# Patient Record
Sex: MALE | Race: BLACK OR AFRICAN AMERICAN
[De-identification: names, ages, dates, MRNs, and addresses within clinical notes are randomized per-mention and may not be internally consistent; named-entity substitution may affect disease eponyms.]

---

## 2018-12-04 ENCOUNTER — HOSPITAL ENCOUNTER (INPATIENT)
Dept: HOSPITAL 61 - 5 SOUTH | Age: 23
LOS: 4 days | Discharge: LEFT BEFORE DISCHARGE | DRG: 684 | End: 2018-12-08
Payer: COMMERCIAL

## 2018-12-04 VITALS — SYSTOLIC BLOOD PRESSURE: 143 MMHG | DIASTOLIC BLOOD PRESSURE: 87 MMHG

## 2018-12-04 VITALS — SYSTOLIC BLOOD PRESSURE: 136 MMHG | DIASTOLIC BLOOD PRESSURE: 94 MMHG

## 2018-12-04 VITALS — SYSTOLIC BLOOD PRESSURE: 145 MMHG | DIASTOLIC BLOOD PRESSURE: 93 MMHG

## 2018-12-04 VITALS — SYSTOLIC BLOOD PRESSURE: 134 MMHG | DIASTOLIC BLOOD PRESSURE: 90 MMHG

## 2018-12-04 VITALS — BODY MASS INDEX: 29.53 KG/M2 | WEIGHT: 230.12 LBS | HEIGHT: 74 IN

## 2018-12-04 DIAGNOSIS — Z79.899: ICD-10-CM

## 2018-12-04 DIAGNOSIS — Z88.8: ICD-10-CM

## 2018-12-04 DIAGNOSIS — I10: ICD-10-CM

## 2018-12-04 DIAGNOSIS — E88.09: ICD-10-CM

## 2018-12-04 DIAGNOSIS — N17.9: Primary | ICD-10-CM

## 2018-12-04 DIAGNOSIS — Z53.21: ICD-10-CM

## 2018-12-04 DIAGNOSIS — N04.0: ICD-10-CM

## 2018-12-04 LAB
ALBUMIN SERPL-MCNC: 1 G/DL (ref 3.4–5)
ALBUMIN/GLOB SERPL: 0.3 {RATIO} (ref 1–1.7)
ALP SERPL-CCNC: 66 U/L (ref 46–116)
ALT SERPL-CCNC: 67 U/L (ref 16–63)
ANION GAP SERPL CALC-SCNC: 4 MMOL/L (ref 6–14)
APTT PPP: YELLOW S
AST SERPL-CCNC: 47 U/L (ref 15–37)
BACTERIA #/AREA URNS HPF: 0 /HPF
BILIRUB SERPL-MCNC: 0.4 MG/DL (ref 0.2–1)
BILIRUB UR QL STRIP: NEGATIVE
BUN SERPL-MCNC: 18 MG/DL (ref 8–26)
BUN/CREAT SERPL: 16 (ref 6–20)
CALCIUM SERPL-MCNC: 8.1 MG/DL (ref 8.5–10.1)
CHLORIDE SERPL-SCNC: 102 MMOL/L (ref 98–107)
CO2 SERPL-SCNC: 30 MMOL/L (ref 21–32)
CREAT SERPL-MCNC: 1.1 MG/DL (ref 0.7–1.3)
ERYTHROCYTE [DISTWIDTH] IN BLOOD BY AUTOMATED COUNT: 13.8 % (ref 11.5–14.5)
FIBRINOGEN PPP-MCNC: CLEAR MG/DL
GFR SERPLBLD BASED ON 1.73 SQ M-ARVRAT: 100.4 ML/MIN
GLOBULIN SER-MCNC: 3.5 G/DL (ref 2.2–3.8)
GLUCOSE SERPL-MCNC: 93 MG/DL (ref 70–99)
HCT VFR BLD CALC: 49.3 % (ref 39–53)
HGB BLD-MCNC: 16.9 G/DL (ref 13–17.5)
HYALINE CASTS #/AREA URNS LPF: (no result) /HPF
MCH RBC QN AUTO: 29 PG (ref 25–35)
MCHC RBC AUTO-ENTMCNC: 34 G/DL (ref 31–37)
MCV RBC AUTO: 84 FL (ref 79–100)
NITRITE UR QL STRIP: NEGATIVE
PH UR STRIP: 6.5 [PH]
PLATELET # BLD AUTO: 272 X10^3/UL (ref 140–400)
POTASSIUM SERPL-SCNC: 3.8 MMOL/L (ref 3.5–5.1)
PROT SERPL-MCNC: 4.5 G/DL (ref 6.4–8.2)
PROT UR STRIP-MCNC: >=300 MG/DL
RBC # BLD AUTO: 5.89 X10^6/UL (ref 4.3–5.7)
RBC #/AREA URNS HPF: (no result) /HPF (ref 0–2)
SODIUM SERPL-SCNC: 136 MMOL/L (ref 136–145)
UROBILINOGEN UR-MCNC: 0.2 MG/DL
WBC # BLD AUTO: 8.6 X10^3/UL (ref 4–11)
WBC #/AREA URNS HPF: (no result) /HPF (ref 0–4)

## 2018-12-04 PROCEDURE — 76770 US EXAM ABDO BACK WALL COMP: CPT

## 2018-12-04 PROCEDURE — 84156 ASSAY OF PROTEIN URINE: CPT

## 2018-12-04 PROCEDURE — 82570 ASSAY OF URINE CREATININE: CPT

## 2018-12-04 PROCEDURE — 80061 LIPID PANEL: CPT

## 2018-12-04 PROCEDURE — 93971 EXTREMITY STUDY: CPT

## 2018-12-04 PROCEDURE — 81001 URINALYSIS AUTO W/SCOPE: CPT

## 2018-12-04 PROCEDURE — 82575 CREATININE CLEARANCE TEST: CPT

## 2018-12-04 PROCEDURE — 36415 COLL VENOUS BLD VENIPUNCTURE: CPT

## 2018-12-04 PROCEDURE — 80069 RENAL FUNCTION PANEL: CPT

## 2018-12-04 PROCEDURE — 85027 COMPLETE CBC AUTOMATED: CPT

## 2018-12-04 PROCEDURE — 80053 COMPREHEN METABOLIC PANEL: CPT

## 2018-12-04 RX ADMIN — FUROSEMIDE SCH MG: 20 TABLET ORAL at 19:20

## 2018-12-05 VITALS — SYSTOLIC BLOOD PRESSURE: 150 MMHG | DIASTOLIC BLOOD PRESSURE: 93 MMHG

## 2018-12-05 VITALS — SYSTOLIC BLOOD PRESSURE: 141 MMHG | DIASTOLIC BLOOD PRESSURE: 89 MMHG

## 2018-12-05 VITALS — SYSTOLIC BLOOD PRESSURE: 159 MMHG | DIASTOLIC BLOOD PRESSURE: 98 MMHG

## 2018-12-05 VITALS — SYSTOLIC BLOOD PRESSURE: 138 MMHG | DIASTOLIC BLOOD PRESSURE: 82 MMHG

## 2018-12-05 VITALS — SYSTOLIC BLOOD PRESSURE: 143 MMHG | DIASTOLIC BLOOD PRESSURE: 86 MMHG

## 2018-12-05 VITALS — DIASTOLIC BLOOD PRESSURE: 98 MMHG | SYSTOLIC BLOOD PRESSURE: 145 MMHG

## 2018-12-05 LAB — PROT UR-MCNC: 2390.1 MG/DL

## 2018-12-05 RX ADMIN — FUROSEMIDE SCH MG: 20 TABLET ORAL at 08:28

## 2018-12-05 NOTE — PDOC2
CONSULT


Date of Consult


Date of Consult


DATE: 12/5/18 


TIME: 16:02





Reason for Consult


Reason for Consult:


Proteinuria, Dx of TAYLOR





Identification/Chief Complaint


Chief Complaint


Swelling both arms and back





Source


Source:  Caregiver, Chart review, Patient





History of Present Illness


Reason for Visit:


The patient is a 23-year-old  male patient with Dx of TAYLOR Dx at 

the age of 14 yrs . He gas been on prednisone and ws tapered off aprox 18 

months ago when he was in [place] as he was in remission 


He was also on ARB which was dced by MD as per Pt and Mother  as his BP was 

stable  . They Moved to [facility] 18 months back and was seen by Dr. Schultz from 

our group in Mid 2017  .


 Mom reports they did not go back for Follow up appt as Urine was Normal and Pt 

was feeling fine  





He apparently was diagnosed initially at the age of 14 at [facility] 

in [place]   He moved to [place], [place] where he was evaluated


in [facility] and about 4 years ago he underwent another kidney 

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  


He reports 2 days back he started having vomiting and thinks it was due to 

something he ate . His Mom claims that he eats out a Lot  .No F/C/D. No CP .


 He apparently gained almost 20 pounds, weight  here   was  234 pounds, he 

reports his wt has been in 190's    Denied any abdominal pain.  Denied any


dizziness or lightheadedness.


No Vomiting today. Main concern is swelling in his arms- though in past he had 

LE edema. He feels Rt arm improved but Lt worse since IV placed 


He also feels swelling in his back- Flank and Lower back . No Pain /Tenderness 


He denies any Hx of Kidney stones . He denies any NSAID's , No OTC meds, Denies 

any Creatine or AA supplements 


His average BP is 120's without any meds





Current Medications


Current Medications





Current Medications


Acetaminophen (Tylenol) 650 mg PRN Q6HRS  PRN PO MILD PAIN / TEMP Last 

administered on 12/4/18at 18:10;  Start 12/4/18 at 13:30


Furosemide (Lasix) 20 mg DAILY PO ;  Start 12/5/18 at 09:00;  Stop 12/5/18 at 09

:00;  Status DC


Furosemide (Lasix) 20 mg DAILY PO  Last administered on 12/5/18at 08:28;  Start 

12/4/18 at 18:15





Active Scripts


Active


Reported


Furosemide 20 Mg Tablet 20 Mg PO DAILY


Prednisone 20 Mg Tablet 20 Mg PO DAILY





Allergies


Allergies:  


Coded Allergies:  


     lisinopril (Verified  Allergy, Intermediate, 12/4/18)


 


 Wet cough





ROS


Review of System


   As per HPI





Physical Exam


Physical Exam


GEN:  NAD  


HEEN:      OM moist  


NECK: Supple, No JVD  


CVS:   RRR, No MGR 


RESP: CTA, No acc Muscle use 


GI:        BS + ve, NO Bruit, Non Tender, Non Distended


:      No CVA tenderness, No  Suprapubic Tenderness, No Brunner


Skin- No rash


Ext- Trace LE edema , Lt arm swollen  > Rt (mild)


Vital Signs





Vital Signs








  Date Time  Temp Pulse Resp B/P (MAP) Pulse Ox O2 Delivery O2 Flow Rate FiO2


 


12/5/18 15:00 97.7 62 20 150/93 (112) 95 Room Air  





 97.7       








Assessment & Plan


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


Was on prednisone , tapered approx 18 Months in [place]  


Moved to  since, seen by Dr. Schultz once last year, didint go for Follow up 

appt 


Will obtain Office records and awaiting records from [place]  


UA reviewed  , Creat  within Normal range  


24 Ur Pr in progress, Check US, Pr/Cr  


StrictI/O, daily standing weight  





HTN- BP High 


Not on meds 


Awaiting labs , will consider starting ACE-I or ARB  





Lt Upper arm swelling - Order US  





Vomiting Resolved


Likely Gastroenteritis 





Discussed at great length with patient, his Mom and RN





Labs


Labs





Laboratory Tests








Test


 12/4/18


14:30 12/4/18


19:27


 


White Blood Count


 8.6 x10^3/uL


(4.0-11.0) 





 


Red Blood Count


 5.89 x10^6/uL


(4.30-5.70) 





 


Hemoglobin


 16.9 g/dL


(13.0-17.5) 





 


Hematocrit


 49.3 %


(39.0-53.0) 





 


Mean Corpuscular Volume 84 fL ()  


 


Mean Corpuscular Hemoglobin 29 pg (25-35)  


 


Mean Corpuscular Hemoglobin


Concent 34 g/dL


(31-37) 





 


Red Cell Distribution Width


 13.8 %


(11.5-14.5) 





 


Platelet Count


 272 x10^3/uL


(140-400) 





 


Sodium Level


 136 mmol/L


(136-145) 





 


Potassium Level


 3.8 mmol/L


(3.5-5.1) 





 


Chloride Level


 102 mmol/L


() 





 


Carbon Dioxide Level


 30 mmol/L


(21-32) 





 


Anion Gap 4 (6-14)  


 


Blood Urea Nitrogen


 18 mg/dL


(8-26) 





 


Creatinine


 1.1 mg/dL


(0.7-1.3) 





 


Estimated GFR


(Cockcroft-Gault) 100.4 


 





 


BUN/Creatinine Ratio 16 (6-20)  


 


Glucose Level


 93 mg/dL


(70-99) 





 


Calcium Level


 8.1 mg/dL


(8.5-10.1) 





 


Total Bilirubin


 0.4 mg/dL


(0.2-1.0) 





 


Aspartate Amino Transf


(AST/SGOT) 47 U/L (15-37) 


 





 


Alanine Aminotransferase


(ALT/SGPT) 67 U/L (16-63) 


 





 


Alkaline Phosphatase


 66 U/L


() 





 


Total Protein


 4.5 g/dL


(6.4-8.2) 





 


Albumin


 1.0 g/dL


(3.4-5.0) 





 


Albumin/Globulin Ratio 0.3 (1.0-1.7)  


 


Urine Collection Type  Unknown 


 


Urine Color  Yellow 


 


Urine Clarity  Clear 


 


Urine pH  6.5 


 


Urine Specific Gravity  1.020 


 


Urine Protein


 


 >=300 mg/dL


(NEG-TRACE)


 


Urine Glucose (UA)


 


 Negative mg/dL


(NEG)


 


Urine Ketones (Stick)


 


 Negative mg/dL


(NEG)


 


Urine Blood  Moderate (NEG) 


 


Urine Nitrite  Negative (NEG) 


 


Urine Bilirubin  Negative (NEG) 


 


Urine Urobilinogen Dipstick


 


 0.2 mg/dL (0.2


mg/dL)


 


Urine Leukocyte Esterase  Negative (NEG) 


 


Urine RBC  1-2 /HPF (0-2) 


 


Urine WBC  1-4 /HPF (0-4) 


 


Urine Bacteria  0 /HPF (0-FEW) 


 


Urine Hyaline Casts


 


 Occasional


/HPF


 


Urine Random Total Protein


 


 1484.0 mg/dL


(Not Estab.)








Laboratory Tests








Test


 12/4/18


19:27


 


Urine Collection Type Unknown 


 


Urine Color Yellow 


 


Urine Clarity Clear 


 


Urine pH 6.5 


 


Urine Specific Gravity 1.020 


 


Urine Protein


 >=300 mg/dL


(NEG-TRACE)


 


Urine Glucose (UA)


 Negative mg/dL


(NEG)


 


Urine Ketones (Stick)


 Negative mg/dL


(NEG)


 


Urine Blood Moderate (NEG) 


 


Urine Nitrite Negative (NEG) 


 


Urine Bilirubin Negative (NEG) 


 


Urine Urobilinogen Dipstick


 0.2 mg/dL (0.2


mg/dL)


 


Urine Leukocyte Esterase Negative (NEG) 


 


Urine RBC 1-2 /HPF (0-2) 


 


Urine WBC 1-4 /HPF (0-4) 


 


Urine Bacteria 0 /HPF (0-FEW) 


 


Urine Hyaline Casts


 Occasional


/HPF


 


Urine Random Total Protein


 1484.0 mg/dL


(Not Estab.)








Review


All relevant outside records, renal labs, imaging studies, telemetry/EKG's were 

reviewed.











EZ CABA MD Dec 5, 2018 16:16

## 2018-12-05 NOTE — HP
ADMIT DATE:  12/05/2018



HISTORY OF PRESENT ILLNESS:  The patient is a 23-year-old  male

patient who was seen yesterday and was admitted initially under Dr. Ramirez to

[facility] with a relapse of his minimal change disease.  He apparently

was diagnosed initially at the age of 14 at [facility] [place] and

was treated with steroids.  He moved to [place], [place] where he was evaluated

in [facility] and about 4 years ago he underwent another kidney

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  He stopped all his

medication about 18 months ago.  He was on prednisone and Diovan and has been

doing fine until 2 days ago when he started having nausea, vomiting and weight

gain.  He apparently gained almost 20 pounds, although he was weighed here

yesterday and was weighing 234 pounds.  He did complain of some nausea and

vomited 2 days ago, but not yesterday.  Denied any abdominal pain.  Denied any

dizziness or lightheadedness.



PAST MEDICAL HISTORY:  Significant for minimal change disease as well as

hypertension.



PAST SURGICAL HISTORY:  Unremarkable.



ALLERGIES:  HE IS ALLERGIC TO LISINOPRIL AS HE DEVELOPED CHRONIC COUGH.



MEDICATIONS:  He is currently on no medication.



FAMILY HISTORY:  He has 3 sisters, one brother, all healthy.  His father is

alive at the age of 42 and has sarcoidosis.  Mother is alive at age of 41 and is

healthy.



SOCIAL HISTORY:  He is single, graduated from high school.  He does not smoke,

drink alcohol or use any recreational drugs.  He is currently unemployed.



REVIEW OF SYSTEMS:  The patient denied any blurring of vision, cataract,

glaucoma or macular degeneration.  Denied any earache, tinnitus or sensorineural

deafness.  Denied any nosebleeds, stuffy nose or postnasal drip.  Denied any

sore throat, sore tongue, toothache, hoarseness of voice or difficulty

swallowing.  Did complain of some nausea and vomiting and has tendency towards

constipation.  Denied any hematemesis, melena or hematochezia.  Denied any

dysuria, frequency or hematuria.  Denied any chest pain, shortness of breath,

orthopnea or paroxysmal nocturnal dyspnea.  Denied any cough, phlegm or

hemoptysis.  Denied any chills, rigors or fever.



PHYSICAL EXAMINATION:

GENERAL:  When I saw him, he looked well and was clearly in no apparent

respiratory distress.  No pallor, jaundice, cyanosis, or thyromegaly.  No

jugular venous distention.  I could not really see any lower limb edema.

VITAL SIGNS:  His heart rate was 73, blood pressure 145/93, temperature was

98.2, respiratory rate was 17 and oxygen saturation was 96% on room air.

HEAD, EYES, EARS, NOSE AND THROAT:  Showed normocephalic, atraumatic.

NECK:  Supple.

HEART:  Showed normal first and second heart sounds with no gallop, rub or

murmur.

CHEST:  Clear to auscultation.  No crepitation or rhonchi.

ABDOMEN:  Distended, soft, nontender.  No guarding or rigidity.  No

organomegaly.  All hernial orifices intact.  Bowel sounds normal.

NEUROLOGIC:  He is awake, alert, responding appropriately.  All cranial nerves

intact.

EXTREMITIES:  He moves extremities without difficulty, ambulates without

assistance or assistive devices.  Examination of the lower extremities showed no

clubbing, cyanosis, and I do not see really any edema on both lower extremities.

 His upper extremities seem to be more swollen than his lower extremities.



LABORATORY DATA:  Showed a serum sodium 136, potassium 3.8, chloride 102,

bicarbonate 30, anion gap of 4, BUN 18, creatinine 1.1, estimated GFR was 100 mL

per minute.  His glucose was 93, calcium was 8.1.  Total bilirubin and alkaline

phosphatase normal.  AST, ALT is slightly elevated.  Total protein was 4.5,

albumin was 1.  His white cell count was 8600, hemoglobin 16.9, hematocrit 49.3,

MCV 84 and platelet count 272,000.  His urinalysis showed the urine was yellow,

clear, his pH was 6.5, specific gravity was 1.020.  There was large amount of

protein.  The urine was negative for glucose, ketones.  There was moderate

amount of blood, negative for nitrite and leukocyte esterase, 1-2 rbc's, 1-4

wbc's, and no bacteria.



IMPRESSION:  In summary, this is a 23-year-old  male patient

with known history of minimal change disease diagnosed when he was 14 years old.

 His last biopsy was done at [facility] in [place], [place] about 4 years

ago and he stopped taking his steroids and Diovan about 18 months ago.  He came

now complaining of weight gain, nausea and vomiting, and high blood pressure. 

His urinalysis showed large amount of protein.  He has also moderate amount of

blood.



PLAN:  My plan is to get the records from [place], [place], also do a 24-hour

urine for proteinuria and creatinine clearance as he seemed to have nephrotic

syndrome as he has hematuria and his blood pressure is high, although his kidney

functions are normal.  He has severe hypoalbuminemia.  We will consult the

Nephrology team and decide on further management accordingly.

 



______________________________

RICARDA CHRISTOPHER MD



DR:  KYREE/eloisa  JOB#:  [id number] / [id number]

DD:  12/05/2018 09:49  DT:  12/05/2018 10:06

## 2018-12-06 VITALS — SYSTOLIC BLOOD PRESSURE: 139 MMHG | DIASTOLIC BLOOD PRESSURE: 97 MMHG

## 2018-12-06 VITALS — DIASTOLIC BLOOD PRESSURE: 80 MMHG | SYSTOLIC BLOOD PRESSURE: 134 MMHG

## 2018-12-06 VITALS — SYSTOLIC BLOOD PRESSURE: 145 MMHG | DIASTOLIC BLOOD PRESSURE: 91 MMHG

## 2018-12-06 VITALS — DIASTOLIC BLOOD PRESSURE: 102 MMHG | SYSTOLIC BLOOD PRESSURE: 141 MMHG

## 2018-12-06 VITALS — DIASTOLIC BLOOD PRESSURE: 90 MMHG | SYSTOLIC BLOOD PRESSURE: 130 MMHG

## 2018-12-06 VITALS — DIASTOLIC BLOOD PRESSURE: 86 MMHG | SYSTOLIC BLOOD PRESSURE: 130 MMHG

## 2018-12-06 LAB
ALBUMIN SERPL-MCNC: 0.7 G/DL (ref 3.4–5)
ANION GAP SERPL CALC-SCNC: 2 MMOL/L (ref 6–14)
BUN SERPL-MCNC: 22 MG/DL (ref 8–26)
CALCIUM SERPL-MCNC: 7.6 MG/DL (ref 8.5–10.1)
CHLORIDE SERPL-SCNC: 102 MMOL/L (ref 98–107)
CO2 SERPL-SCNC: 31 MMOL/L (ref 21–32)
CREAT SERPL-MCNC: 1.3 MG/DL (ref 0.7–1.3)
GFR SERPLBLD BASED ON 1.73 SQ M-ARVRAT: 82.8 ML/MIN
GLUCOSE SERPL-MCNC: 98 MG/DL (ref 70–99)
PHOSPHATE SERPL-MCNC: 4.2 MG/DL (ref 2.6–4.7)
POTASSIUM SERPL-SCNC: 4 MMOL/L (ref 3.5–5.1)
SODIUM SERPL-SCNC: 135 MMOL/L (ref 136–145)

## 2018-12-06 RX ADMIN — PANTOPRAZOLE SODIUM SCH MG: 40 TABLET, DELAYED RELEASE ORAL at 13:36

## 2018-12-06 RX ADMIN — FUROSEMIDE SCH MG: 20 TABLET ORAL at 07:36

## 2018-12-06 NOTE — PDOC
SUBJECTIVE


ROS


No complaints, swelling improved, eating Lunch





OBJECTIVE


Vital Signs





Vital Signs








  Date Time  Temp Pulse Resp B/P (MAP) Pulse Ox O2 Delivery O2 Flow Rate FiO2


 


12/6/18 11:02 98.7 74 20 139/97 (111) 94 Room Air  





 98.7       








I & 0











Intake and Output 


 


 12/6/18





 07:00


 


Intake Total 840 ml


 


Balance 840 ml


 


 


 


Intake Oral 840 ml


 


# Voids 2











PHYSICAL EXAM


Physical Exam


GEN:  NAD  


HEEN:      OM moist  


NECK: Supple, No JVD  


CVS:   RRR, No MGR 


RESP: CTA, No acc Muscle use 


GI:        BS + ve, NO Bruit, Non Tender, Non Distended


:      No CVA tenderness, No  Suprapubic Tenderness, No Brunner


Skin- No rash


Ext- No edema





DIAGNOSIS/ASSESSMENT


Assessment & Plan


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


Was on prednisone , tapered approx 18 Months in [place] 


Non compliance in 2015  - when he moved to [place]  - had recurrence of TAYLOR 


Was started on prednisone 90 mg QD , prior to that he was on tacrolimus and 

Steroids at age of [age] yrs 


Moved to  since, seen by Dr. Schultz once last year, didint go for Follow up 

appt 


Pr/Cr 10 gm , 24 Hr pending  , Creat upto 1.3, Severe Hypoalbuminemia  


Will start Prednisone PO- close to 1 mg/kg /day (90 mg QD), monitor closely 





CKD stage 2- increased Cr from baseline 


Plan as above  








HTN- BP better  


Not on meds 


 will consider starting ACE-I or ARB  





Lt Upper arm swelling -  US  No DVT


Resolved  





Vomiting Resolved


Likely Gastroenteritis 





Discussed at great length with patient,  Mom and RN





COMMENT/RELEVANT DATA


Meds





Current Medications








 Medications


  (Trade)  Dose


 Ordered  Sig/Eric  Start Time


 Stop Time Status Last Admin


Dose Admin


 


 Acetaminophen


  (Tylenol)  650 mg  PRN Q6HRS  PRN  12/4/18 13:30


    12/4/18 18:10


650 MG


 


 Furosemide


  (Lasix)  20 mg  DAILY  12/4/18 18:15


    12/6/18 07:36


20 MG








Lab





Laboratory Tests








Test


 12/6/18


10:25


 


Sodium Level


 135 mmol/L


(136-145)


 


Potassium Level


 4.0 mmol/L


(3.5-5.1)


 


Chloride Level


 102 mmol/L


()


 


Carbon Dioxide Level


 31 mmol/L


(21-32)


 


Anion Gap 2 (6-14) 


 


Blood Urea Nitrogen


 22 mg/dL


(8-26)


 


Creatinine


 1.3 mg/dL


(0.7-1.3)


 


Estimated GFR


(Cockcroft-Gault) 82.8 





 


Glucose Level


 98 mg/dL


(70-99)


 


Calcium Level


 7.6 mg/dL


(8.5-10.1)


 


Phosphorus Level


 4.2 mg/dL


(2.6-4.7)


 


Albumin


 0.7 g/dL


(3.4-5.0)








Results


All relevant outside records, renal labs, imaging studies, telemetry/EKG's were 

reviewed.











EZ CABA MD Dec 6, 2018 13:18

## 2018-12-06 NOTE — PN
DATE:  12/06/2018



SUBJECTIVE:  The patient is resting slightly propped up in bed, in no apparent

distress.  Continued to collect his 24-hour urine for proteinuria and creatinine

clearance.  Denied any complaint.  He has had renal ultrasound, which showed

that his right kidney measured 12.7 cm while the left kidney measured 12.9 cm. 

There is no evidence of hydronephrosis or renal mass.  The renal parenchyma or

echogenicity is within normal limits.  The ultrasound of his left upper

extremity showed no evidence of deep vein thrombosis.



OBJECTIVE:

GENERAL:  On examining him, he looked pale.  No jaundice, cyanosis, or

thyromegaly.  No jugular venous distention.  No limb edema.

VITAL SIGNS:  His heart rate was 83, blood pressure was 130/86, temperature was

97.8, respiratory rate was 16, and oxygen saturation was 93% on room air.

The rest of clinical exam is unremarkable, has not really changed.



PLAN:  To continue with Lasix. Await 24-hour urine collection.

 



______________________________

RICARDA CHRISTOPHER MD



DR:  KYREE/eloisa  JOB#:  [id number] / [id number]

DD:  12/06/2018 08:59  DT:  12/06/2018 09:14

## 2018-12-06 NOTE — RAD
Renal ultrasound, 12/5/2018:

 

HISTORY: Proteinuria

 

The right kidney measures 12.7 cm while the left kidney measures 12.9 cm. 

There is no evidence of hydronephrosis or a renal mass. The renal 

parenchymal echogenicity is within normal limits.

 

The bladder is collapsed and not adequately delineated.

 

IMPRESSION: No significant renal abnormality is detected.

 

Electronically signed by: Rick Moritz, MD (12/6/2018 8:19 AM) [facility]

## 2018-12-06 NOTE — RAD
Left upper extremity venous ultrasound, 12/5/2018:

 

HISTORY: Left upper extremity swelling

 

Duplex evaluation of the major veins in the left upper extremity was 

performed including grayscale, color-flow and spectral Doppler analysis.

 

The left internal jugular, subclavian, axillary, brachial, cephalic and 

basilic veins are all patent. Patent ulnar and radial veins are evident.

 

IMPRESSION: There is no duplex evidence of deep vein thrombosis in left 

upper extremity.

 

Electronically signed by: Rick Moritz, MD (12/6/2018 8:14 AM) [facility]

## 2018-12-07 VITALS — DIASTOLIC BLOOD PRESSURE: 102 MMHG | SYSTOLIC BLOOD PRESSURE: 154 MMHG

## 2018-12-07 VITALS — SYSTOLIC BLOOD PRESSURE: 139 MMHG | DIASTOLIC BLOOD PRESSURE: 99 MMHG

## 2018-12-07 VITALS — SYSTOLIC BLOOD PRESSURE: 144 MMHG | DIASTOLIC BLOOD PRESSURE: 93 MMHG

## 2018-12-07 VITALS — SYSTOLIC BLOOD PRESSURE: 136 MMHG | DIASTOLIC BLOOD PRESSURE: 80 MMHG

## 2018-12-07 VITALS — SYSTOLIC BLOOD PRESSURE: 140 MMHG | DIASTOLIC BLOOD PRESSURE: 94 MMHG

## 2018-12-07 VITALS — DIASTOLIC BLOOD PRESSURE: 83 MMHG | SYSTOLIC BLOOD PRESSURE: 138 MMHG

## 2018-12-07 LAB
ALBUMIN SERPL-MCNC: 0.9 G/DL (ref 3.4–5)
ANION GAP SERPL CALC-SCNC: 4 MMOL/L (ref 6–14)
BUN SERPL-MCNC: 39 MG/DL (ref 8–26)
CALCIUM SERPL-MCNC: 8 MG/DL (ref 8.5–10.1)
CHLORIDE SERPL-SCNC: 100 MMOL/L (ref 98–107)
CHOLEST SERPL-MCNC: 539 MG/DL (ref 0–200)
CHOLEST/HDLC SERPL: 8.3 {RATIO}
CO2 SERPL-SCNC: 30 MMOL/L (ref 21–32)
CREAT CLEAR 24: 147 ML/MIN (ref 97–137)
CREAT SERPL-MCNC: 1.04 MG/DL (ref 0.76–1.27)
CREAT SERPL-MCNC: 2.6 MG/DL (ref 0.7–1.3)
CREAT UR-MCNC: 168.8 MG/DL
GFR SERPLBLD BASED ON 1.73 SQ M-ARVRAT: 37.2 ML/MIN
GLUCOSE SERPL-MCNC: 115 MG/DL (ref 70–99)
HDLC SERPL-MCNC: 65 MG/DL (ref 40–60)
IGM SERPL-MCNC: 1692.3 MG/DL
LDLC: 425 MG/DL (ref 0–100)
PHOSPHATE SERPL-MCNC: 4.7 MG/DL (ref 2.6–4.7)
POTASSIUM SERPL-SCNC: 4.9 MMOL/L (ref 3.5–5.1)
PROT 24H UR-MRATE: (no result) MG/24 HR (ref 30–150)
SODIUM SERPL-SCNC: 134 MMOL/L (ref 136–145)
TRIGL SERPL-MCNC: 245 MG/DL (ref 0–150)
VLDLC: 49 MG/DL (ref 0–40)

## 2018-12-07 RX ADMIN — PANTOPRAZOLE SODIUM SCH MG: 40 TABLET, DELAYED RELEASE ORAL at 08:44

## 2018-12-07 RX ADMIN — FUROSEMIDE SCH MG: 20 TABLET ORAL at 08:44

## 2018-12-07 NOTE — PN
DATE:  12/07/2018



SUBJECTIVE:  The patient is resting, slightly propped up in bed, eating his

breakfast comfortably.  On questioning him, he obviously is concerned about his

blood pressure which is trending upward.  He was started yesterday on prednisone

at 90 mg daily.  His 24-hour urine collection was completed yesterday; however,

I am not really sure exactly how to read the results, it seems that he is losing

a huge amount of protein up to 22 grams in 24-hour if my reading is accurate.



PHYSICAL EXAMINATION:

GENERAL:  When I examined him this morning, he looked well and was clearly in no

apparent respiratory distress, pale.  No jaundice, cyanosis, or thyromegaly.  No

jugular venous distension.  No limb edema.

VITAL SIGNS:  His heart rate was 96, blood pressure 144/93, temperature was

97.6, respiratory rate was 20, and oxygen saturation was 96%.

HEAD, EYES, EARS, NOSE AND THROAT:  Normocephalic, atraumatic.

The rest of clinical exam is unremarkable.



LABORATORY DATA:  His most recent lab work as of yesterday showed his serum

sodium 135, potassium 4, chloride 102, bicarbonate 31, anion gap of 2, BUN of

22, creatinine 1.3, his estimated GFR was 82 mL per minute.  His glucose was 98,

calcium was 7.6, phosphorus was 4.2.  His serum albumin is only 0.7 g/dL.



ASSESSMENT:  Minimal change disease, nephrotic syndrome and relapse with

nephrotic range proteinuria, for which he was started on a prednisone 90 mg once

a day, Protonix as well as furosemide.  Blood pressure is high and he apparently

has problems with lisinopril before as he developed chronic cough.  I will wait

for the recommendation by the nephrologist.

 



______________________________

RICARDA CHRISTOPHER MD



DR:  KYREE/eloisa  JOB#:  [id number] / [id number]

DD:  12/07/2018 09:31  DT:  12/07/2018 10:12

## 2018-12-07 NOTE — PDOC
SUBJECTIVE


ROS


No complaints,





OBJECTIVE


Vital Signs





Vital Signs








  Date Time  Temp Pulse Resp B/P (MAP) Pulse Ox O2 Delivery O2 Flow Rate FiO2


 


12/7/18 11:00 97.1 82 20 140/94 (109) 98 Room Air  





 97.1       








I & 0











Intake and Output 


 


 12/7/18





 07:00


 


Intake Total 480 ml


 


Balance 480 ml


 


 


 


Intake Oral 480 ml


 


# Voids 6











PHYSICAL EXAM


Physical Exam


GEN:  NAD  


HEEN:      OM moist  


NECK: Supple, No JVD  


CVS:   RRR, No MGR 


RESP: CTA, No acc Muscle use 


GI:        BS + ve, NO Bruit, Non Tender, Non Distended


:      No CVA tenderness, No  Suprapubic Tenderness, No Brunner


Skin- No rash


Ext- No edema





DIAGNOSIS/ASSESSMENT


Assessment & Plan





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


Was on prednisone , tapered approx 18 Months in [place] 


Non compliance in 2015  - when he moved to [place]  - had recurrence of TAYLOR (

Dont have Bx report in Chart  ) 


He Was started on prednisone 90 mg QD , prior to that he was on tacrolimus and 

Steroids at age of 14-15 yrs 


Moved to  since, seen by Dr. Schultz once last year, as per mom they  didn't 

keep  Follow up appt 


Pr/Cr 10 gm , 24 Hr Pr 22 gm    , Creat upto 2.6<--1.3, Severe Hypoalbuminemia 

, Hypercholesterolemia  


Started  Prednisone PO- 90 mg QD- 1 st dose 12/6


Based on labs Today  recommend Renal Bx  dw  Kirstie [facility], but Pt wants his 

mother to make the decision 


She called back and wants to hold off the renal Bx  . She will make decision 

when she arrives here . She states "he is not a Guinea pig and Treatment can be 

continued based on his Bx results from [place] " 


At the time of initial consult I had discussed with at great length Dx and 

Treatment Options including  Steroids and Renal Bx . Initially she  claimed  

that  he doesn't need Steroids as she doesn't think he is having a relapse .


Yesterday  very reluctantly they agreed  for PO Prednisone 


Mom arrived and I had a Lengthy discussion regarding Renal Bx, meds, serologies 

, prognosis etc .  Both  Refusing renal bx as well as IV steroids , they want 

to wait to see response with PO steroids  


They refused any further Work up  including PT/INR. 








Hyperlipidemia -  


Start Statin    





JAYDON - Worsening renal function  


 Has been on Lasix as per primary, DC lasix  





HTN- 


Not on meds 


 will consider starting ACE-I or ARB  but currently JAYDON 


Monitor  


  


Lt Upper arm swelling -  US  No DVT


Resolved  





Vomiting Resolved





Discussed at great length with patient,  Mom and RN





COMMENT/RELEVANT DATA


Meds





Current Medications








 Medications


  (Trade)  Dose


 Ordered  Sig/Eric  Start Time


 Stop Time Status Last Admin


Dose Admin


 


 Acetaminophen


  (Tylenol)  650 mg  PRN Q6HRS  PRN  12/4/18 13:30


    12/4/18 18:10


650 MG


 


 Furosemide


  (Lasix)  20 mg  DAILY  12/4/18 18:15


    12/7/18 08:44


20 MG


 


 Pantoprazole


 Sodium


  (Protonix)  40 mg  DAILYAC  12/6/18 13:15


    12/7/18 08:44


40 MG


 


 Prednisone


  (Prednisone)  90 mg  DAILY  12/6/18 14:00


 12/12/18 13:59  12/7/18 08:45


90 MG








Lab





Laboratory Tests








Test


 12/7/18


11:55


 


Sodium Level


 134 mmol/L


(136-145)


 


Potassium Level


 4.9 mmol/L


(3.5-5.1)


 


Chloride Level


 100 mmol/L


()


 


Carbon Dioxide Level


 30 mmol/L


(21-32)


 


Anion Gap 4 (6-14) 


 


Blood Urea Nitrogen


 39 mg/dL


(8-26)


 


Creatinine


 2.6 mg/dL


(0.7-1.3)


 


Estimated GFR


(Cockcroft-Gault) 37.2 





 


Glucose Level


 115 mg/dL


(70-99)


 


Calcium Level


 8.0 mg/dL


(8.5-10.1)


 


Phosphorus Level


 4.7 mg/dL


(2.6-4.7)


 


Albumin


 0.9 g/dL


(3.4-5.0)


 


Triglycerides Level


 245 mg/dL


(0-150)


 


Cholesterol Level


 539 mg/dL


(0-200)


 


LDL Cholesterol, Calculated


 425 mg/dL


(0-100)


 


VLDL Cholesterol, Calculated


 49 mg/dL


(0-40)


 


Non-HDL Cholesterol Calculated


 474 mg/dL


(0-129)


 


HDL Cholesterol


 65 mg/dL


(40-60)


 


Cholesterol/HDL Ratio 8.3 








Results


All relevant outside records, renal labs, imaging studies, telemetry/EKG's were 

reviewed.











EZ CABA MD Dec 7, 2018 16:25

## 2018-12-08 VITALS — DIASTOLIC BLOOD PRESSURE: 96 MMHG | SYSTOLIC BLOOD PRESSURE: 141 MMHG

## 2018-12-08 VITALS — DIASTOLIC BLOOD PRESSURE: 94 MMHG | SYSTOLIC BLOOD PRESSURE: 155 MMHG

## 2018-12-08 VITALS — SYSTOLIC BLOOD PRESSURE: 142 MMHG | DIASTOLIC BLOOD PRESSURE: 81 MMHG

## 2018-12-08 RX ADMIN — PANTOPRAZOLE SODIUM SCH MG: 40 TABLET, DELAYED RELEASE ORAL at 07:43

## 2018-12-08 NOTE — PN
DATE:  12/08/2018



SUBJECTIVE:  The patient is resting slightly propped up in bed, in no apparent

respiratory distress.  He is awake, alert.  Denied any complaint.  Apparently,

Dr. Mathew recommended kidney biopsy, but the patient and his mother refused

stating that he is not _____ and treatment can be continued based on his biopsy

results from [facility].  However, he has now had hematuria.  His blood

pressure is high and they also refused IV steroids and reluctantly agreed to

p.o. prednisone.  His blood pressure continues to be somewhat high and

apparently, reacted to lisinopril, so I will start him on losartan 25 mg and see

how he responds to that.



PHYSICAL EXAMINATION:

GENERAL:  When I saw him today, he looked well and was clearly in no apparent

respiratory distress.

VITAL SIGNS:  His heart rate was 80, blood pressure was 141/96, temperature was

97.7, respiratory rate 20 and oxygen saturation was 96%.

HEENT:  Examination of the head, eyes, ears, nose and throat showed

normocephalic, atraumatic.

NECK:  Supple.

HEART:  Showed normal first and second heart sounds, with no gallop, rub or

murmur.

CHEST:  Clear to auscultation.  No crepitation or rhonchi.

ABDOMEN:  Distended, soft and nontender.  No guarding or rigidity.  No

organomegaly.  All hernial orifices intact.  Bowel sounds normal.

NEUROLOGIC:  He was awake, alert and responding appropriately.  All cranial

nerves intact.  He moves extremities without difficulty, ambulates without

assistance or assistive devices.  His intake was 1414, output recorded.



LABORATORY DATA:  His most recent lab work as of yesterday showed a serum sodium

134, potassium 4.9, chloride 100, bicarbonate 30, anion gap of 4, BUN 36,

creatinine 2.6, estimated GFR was 37 mL per minute, his glucose was 115 and

calcium was 8.  Phosphorus was 4.7.  His serum albumin was only 0.9.  His

triglycerides were 245, total cholesterol was 539, LDL cholesterol was 425 and

HDL cholesterol of 65; the ratio was 8.  His estimated GFR as of 2 days ago was

116, but his creatinine has dramatically risen from 1.1 to 2.6.



ASSESSMENT:

1.  Acute kidney injury.

2.  Nephrotic syndrome.

3.  Hypertension.

4.  His left upper extremity was swollen, but his ultrasound was negative.



PLAN:  My plan is to obviously consider also DVT prophylaxis, but I will repeat

his labs tomorrow to make sure his kidney function is not worsening.

 



______________________________

RICARDA CHRISTOPHER MD



DR:  KYREE/eloisa  JOB#:  [id number] / [id number]

DD:  12/08/2018 10:15  DT:  12/08/2018 12:24

## 2018-12-08 NOTE — PDOC
PROGRESS NOTES


Subjective


Subjective


I was informed by the patient's nurse that they wished to  leave the hospital 

AMA. Hence patient was not seen. No labs are available for review today





Objective


Objective





Vital Signs








  Date Time  Temp Pulse Resp B/P (MAP) Pulse Ox O2 Delivery O2 Flow Rate FiO2


 


12/8/18 11:00 97.8 79 20 155/94 (114) 94 Room Air  





 97.8       














Intake and Output 


 


 12/8/18





 07:00


 


Intake Total 1440 ml


 


Balance 1440 ml


 


 


 


Intake Oral 1440 ml


 


# Voids 5


 


# Bowel Movements 3











Comment


Review of Relevant


I have reviewed the following items álvaro (where applicable) has been applied.


Labs





Laboratory Tests








Test


 12/7/18


11:55


 


Sodium Level


 134 mmol/L


(136-145)


 


Potassium Level


 4.9 mmol/L


(3.5-5.1)


 


Chloride Level


 100 mmol/L


()


 


Carbon Dioxide Level


 30 mmol/L


(21-32)


 


Anion Gap 4 (6-14) 


 


Blood Urea Nitrogen


 39 mg/dL


(8-26)


 


Creatinine


 2.6 mg/dL


(0.7-1.3)


 


Estimated GFR


(Cockcroft-Gault) 37.2 





 


Glucose Level


 115 mg/dL


(70-99)


 


Calcium Level


 8.0 mg/dL


(8.5-10.1)


 


Phosphorus Level


 4.7 mg/dL


(2.6-4.7)


 


Albumin


 0.9 g/dL


(3.4-5.0)


 


Triglycerides Level


 245 mg/dL


(0-150)


 


Cholesterol Level


 539 mg/dL


(0-200)


 


LDL Cholesterol, Calculated


 425 mg/dL


(0-100)


 


VLDL Cholesterol, Calculated


 49 mg/dL


(0-40)


 


Non-HDL Cholesterol Calculated


 474 mg/dL


(0-129)


 


HDL Cholesterol


 65 mg/dL


(40-60)


 


Cholesterol/HDL Ratio 8.3 








Medications





Current Medications


Acetaminophen (Tylenol) 650 mg PRN Q6HRS  PRN PO MILD PAIN / TEMP Last 

administered on 12/4/18at 18:10;  Start 12/4/18 at 13:30


Furosemide (Lasix) 20 mg DAILY PO ;  Start 12/5/18 at 09:00;  Stop 12/5/18 at 09

:00;  Status DC


Furosemide (Lasix) 20 mg DAILY PO  Last administered on 12/7/18at 08:44;  Start 

12/4/18 at 18:15;  Stop 12/7/18 at 16:12;  Status DC


Prednisone (Prednisone) 90 mg DAILY PO  Last administered on 12/8/18at 07:44;  

Start 12/6/18 at 14:00;  Stop 12/12/18 at 13:59


Pantoprazole Sodium (Protonix) 40 mg DAILYAC PO  Last administered on 12/8/18at 

07:43;  Start 12/6/18 at 13:15





Active Scripts


Active


Reported


Furosemide 20 Mg Tablet 20 Mg PO DAILY


Prednisone 20 Mg Tablet 20 Mg PO DAILY


Vitals/I & O





Vital Sign - Last 24 Hours








 12/7/18 12/7/18 12/7/18 12/7/18





 15:00 19:00 20:00 22:36


 


Temp 98.0 97.7  97.5





 98.0 97.7  97.5


 


Pulse 89 74  72


 


Resp 20 18  18


 


B/P (MAP) 138/83 (101) 136/80 (98)  139/99 (112)


 


Pulse Ox 97 99  98


 


O2 Delivery Room Air Room Air Room Air Room Air


 


    





    





 12/8/18 12/8/18 12/8/18 12/8/18





 02:40 07:00 08:00 11:00


 


Temp 97.5 97.7  97.8





 97.5 97.7  97.8


 


Pulse 81 80  79


 


Resp 18 20  20


 


B/P (MAP) 142/81 (101) 141/96 (111)  155/94 (114)


 


Pulse Ox 96 96  94


 


O2 Delivery Room Air Room Air Room Air Room Air














Intake and Output   


 


 12/7/18 12/7/18 12/8/18





 15:00 23:00 07:00


 


Intake Total 200 ml 240 ml 1000 ml


 


Balance 200 ml 240 ml 1000 ml

















ROMAN MONTERO MD Dec 8, 2018 13:30

## 2018-12-19 ENCOUNTER — HOSPITAL ENCOUNTER (OUTPATIENT)
Dept: HOSPITAL 61 - INTRAD | Age: 23
Discharge: HOME | End: 2018-12-19
Payer: COMMERCIAL

## 2018-12-19 VITALS — SYSTOLIC BLOOD PRESSURE: 150 MMHG | DIASTOLIC BLOOD PRESSURE: 96 MMHG

## 2018-12-19 VITALS — SYSTOLIC BLOOD PRESSURE: 160 MMHG | DIASTOLIC BLOOD PRESSURE: 97 MMHG

## 2018-12-19 VITALS — DIASTOLIC BLOOD PRESSURE: 92 MMHG | SYSTOLIC BLOOD PRESSURE: 151 MMHG

## 2018-12-19 VITALS — SYSTOLIC BLOOD PRESSURE: 158 MMHG | DIASTOLIC BLOOD PRESSURE: 91 MMHG

## 2018-12-19 VITALS — DIASTOLIC BLOOD PRESSURE: 96 MMHG | SYSTOLIC BLOOD PRESSURE: 143 MMHG

## 2018-12-19 VITALS
SYSTOLIC BLOOD PRESSURE: 158 MMHG | DIASTOLIC BLOOD PRESSURE: 95 MMHG | DIASTOLIC BLOOD PRESSURE: 95 MMHG | SYSTOLIC BLOOD PRESSURE: 158 MMHG

## 2018-12-19 VITALS — DIASTOLIC BLOOD PRESSURE: 93 MMHG | SYSTOLIC BLOOD PRESSURE: 155 MMHG

## 2018-12-19 VITALS — SYSTOLIC BLOOD PRESSURE: 149 MMHG | DIASTOLIC BLOOD PRESSURE: 91 MMHG

## 2018-12-19 VITALS — SYSTOLIC BLOOD PRESSURE: 156 MMHG | DIASTOLIC BLOOD PRESSURE: 97 MMHG

## 2018-12-19 VITALS — DIASTOLIC BLOOD PRESSURE: 92 MMHG | SYSTOLIC BLOOD PRESSURE: 157 MMHG

## 2018-12-19 VITALS — DIASTOLIC BLOOD PRESSURE: 105 MMHG | SYSTOLIC BLOOD PRESSURE: 155 MMHG

## 2018-12-19 VITALS — WEIGHT: 230 LBS | BODY MASS INDEX: 29.52 KG/M2 | HEIGHT: 74 IN

## 2018-12-19 VITALS — SYSTOLIC BLOOD PRESSURE: 162 MMHG | DIASTOLIC BLOOD PRESSURE: 94 MMHG

## 2018-12-19 VITALS — SYSTOLIC BLOOD PRESSURE: 162 MMHG | DIASTOLIC BLOOD PRESSURE: 96 MMHG

## 2018-12-19 VITALS — DIASTOLIC BLOOD PRESSURE: 96 MMHG | SYSTOLIC BLOOD PRESSURE: 158 MMHG

## 2018-12-19 VITALS — DIASTOLIC BLOOD PRESSURE: 100 MMHG | SYSTOLIC BLOOD PRESSURE: 161 MMHG

## 2018-12-19 VITALS — SYSTOLIC BLOOD PRESSURE: 156 MMHG | DIASTOLIC BLOOD PRESSURE: 88 MMHG

## 2018-12-19 VITALS — DIASTOLIC BLOOD PRESSURE: 97 MMHG | SYSTOLIC BLOOD PRESSURE: 156 MMHG

## 2018-12-19 VITALS — SYSTOLIC BLOOD PRESSURE: 160 MMHG | DIASTOLIC BLOOD PRESSURE: 94 MMHG

## 2018-12-19 DIAGNOSIS — Z79.899: ICD-10-CM

## 2018-12-19 DIAGNOSIS — N26.9: ICD-10-CM

## 2018-12-19 DIAGNOSIS — Z88.8: ICD-10-CM

## 2018-12-19 DIAGNOSIS — N04.0: Primary | ICD-10-CM

## 2018-12-19 DIAGNOSIS — N17.9: ICD-10-CM

## 2018-12-19 LAB
BASOPHILS # BLD AUTO: 0.1 X10^3/UL (ref 0–0.2)
BASOPHILS NFR BLD: 1 % (ref 0–3)
EOSINOPHIL NFR BLD: 0.4 X10^3/UL (ref 0–0.7)
EOSINOPHIL NFR BLD: 4 % (ref 0–3)
ERYTHROCYTE [DISTWIDTH] IN BLOOD BY AUTOMATED COUNT: 13.7 % (ref 11.5–14.5)
HCT VFR BLD CALC: 43.7 % (ref 39–53)
HGB BLD-MCNC: 15 G/DL (ref 13–17.5)
LYMPHOCYTES # BLD: 2.8 X10^3/UL (ref 1–4.8)
LYMPHOCYTES NFR BLD AUTO: 31 % (ref 24–48)
MCH RBC QN AUTO: 29 PG (ref 25–35)
MCHC RBC AUTO-ENTMCNC: 34 G/DL (ref 31–37)
MCV RBC AUTO: 84 FL (ref 79–100)
MONO #: 0.6 X10^3/UL (ref 0–1.1)
MONOCYTES NFR BLD: 6 % (ref 0–9)
NEUT #: 5.1 X10^3UL (ref 1.8–7.7)
NEUTROPHILS NFR BLD AUTO: 57 % (ref 31–73)
PLATELET # BLD AUTO: 351 X10^3/UL (ref 140–400)
PROTHROMBIN TIME: 12.1 SEC (ref 11.7–14)
RBC # BLD AUTO: 5.21 X10^6/UL (ref 4.3–5.7)
WBC # BLD AUTO: 8.9 X10^3/UL (ref 4–11)

## 2018-12-19 PROCEDURE — 85610 PROTHROMBIN TIME: CPT

## 2018-12-19 PROCEDURE — 85025 COMPLETE CBC W/AUTO DIFF WBC: CPT

## 2018-12-19 PROCEDURE — 99153 MOD SED SAME PHYS/QHP EA: CPT

## 2018-12-19 PROCEDURE — 99152 MOD SED SAME PHYS/QHP 5/>YRS: CPT

## 2018-12-19 PROCEDURE — 36415 COLL VENOUS BLD VENIPUNCTURE: CPT

## 2018-12-19 PROCEDURE — 76942 ECHO GUIDE FOR BIOPSY: CPT

## 2018-12-19 PROCEDURE — 50200 RENAL BIOPSY PERQ: CPT

## 2018-12-19 NOTE — PDOC
MODERATE SEDATION ASSESSMENT


RISKS/ALTERNATIVES


Risks/Alternatives


Risks and alternatives of this type of sedation and procedure discussed with:


RISK/ALTERNATIVES:  Patient





H & P ON CHART


H & P


H & P on chart and reviewed for co-morbid conditions and appropriate labs.


H&P ON CHART:  Yes





PREGNANCY STATUS


PREG STATUS ASSESSED:  Yes





MEDS/ALLERGIES REVIEWED


Meds/Allergies Reviewed


Medications and Allergies including time and route of recently administered 

narcotics and sedatives.


MEDS/ALLERGIES REVIEWED:  Yes





ASA RATING


ASA RATING:  II





AIRWAY ASSESSMENT


Airway Assessment


Airway patency, oral function limitations, presence  of caps, crowns, dentures, 

partials, and ability to extend neck assessed.


AIRWAY ASSESSMENT:  Yes





MALLAMPATI SCORE


MALLAMPATI SCORE:  II





PRE-SEDATION ASSESSMENT


PRE-SEDATION ASSESSMENT:  Yes











BLANCA LOPEZ MD Dec 19, 2018 09:59

## 2018-12-19 NOTE — PDOC1
History and Physical


Date of Procedure


Date of Admission








History of Present Illness


Reason for Visit


renal failure





Past Medical History


Past Medical History


see nursing pre-op assessment





Current Medications


Current Medications





Current Medications


Lidocaine/Sodium Bicarbonate (Buffered Lidocaine 1%) 3 ml STK-MED ONCE .ROUTE ;

  Start 12/19/18 at 07:54;  Stop 12/19/18 at 07:55;  Status DC


Midazolam HCl (Versed) 2 mg STK-MED ONCE .ROUTE ;  Start 12/19/18 at 07:54;  

Stop 12/19/18 at 07:56;  Status DC


Fentanyl Citrate (Fentanyl 2ml Vial) 100 mcg STK-MED ONCE .ROUTE ;  Start 12/19/ 18 at 07:54;  Stop 12/19/18 at 07:56;  Status DC


Gelatin (Gelfoam  Size 12-7mm) 1 each STK-MED ONCE .ROUTE ;  Start 12/19/18 at 

07:54;  Stop 12/19/18 at 07:56;  Status DC


Lidocaine/Sodium Bicarbonate (Buffered Lidocaine 1%) 3 ml 1X  ONCE IJ  Last 

administered on 12/19/18at 09:01;  Start 12/19/18 at 08:15;  Stop 12/19/18 at 08

:18;  Status DC


Midazolam HCl (Versed) 2 mg 1X  ONCE IV  Last administered on 12/19/18at 09:01;

  Start 12/19/18 at 08:15;  Stop 12/19/18 at 08:18;  Status DC


Fentanyl Citrate (Fentanyl 2ml Vial) 100 mcg 1X  ONCE IV  Last administered on 

12/19/18at 09:01;  Start 12/19/18 at 08:15;  Stop 12/19/18 at 08:18;  Status DC


Gelatin (Gelfoam  Size 12-7mm) 1 each 1X  ONCE TP  Last administered on 12/19/ 18at 09:00;  Start 12/19/18 at 08:15;  Stop 12/19/18 at 08:18;  Status DC





Active Scripts


Active


No Active Prescriptions or Reported Medications





Allergies


Allergies:  


Coded Allergies:  


     lisinopril (Verified  Allergy, Intermediate, 12/4/18)


 


 Wet cough





Physical Exam


Vital Signs





Vital Signs








  Date Time  Temp Pulse Resp B/P (MAP) Pulse Ox O2 Delivery O2 Flow Rate FiO2


 


12/19/18 09:55  67 16  95 Room Air  


 


12/19/18 09:08       2.0 


 


12/19/18 07:32 98.1   150/96 (114)    





 98.1       








Other


see nursing assessment





Assessment


Assessment


renal failure





Plan


Plan


US biopsy











BLANCA LOPEZ MD Dec 19, 2018 10:00

## 2018-12-19 NOTE — RAD
Procedure: Ultrasound-guided left renal biopsy



Clinical Indication: Adult male with abnormal renal function



Sedation: Conscious sedation was administered with a total intraprocedural

face-to-face time of 22 minutes.  The patient was monitored by a qualified

independent observer throughout the time of sedation.  Please refer to the

medical record for exact doses of medications utilized to achieve moderate

sedation. 



Antibiotics: None



Sterility: The procedure was performed in its entirety using appropriate

elements of sterile technique. 



Consent: 

The procedure was explained in its entirety to the patient or the patients

designated representative by a member of the treatment team, including a

discussion of the risks, benefits and commonly accepted alternatives to the

procedure, as well as the expected consequences of no therapy whatsoever.

Discussion of the risks included, but was not limited to, those that are most

frequent and those that are rare but possibly severe or life-threatening, as

well as the possibility of unforeseen complications.



Technique and Findings: Following informed consent, the patient was prepped

and draped in the usual sterile fashion.  Ultrasound interrogation of the left

kidney revealed no hydronephrosis.  1% lidocaine was used to achieve local

anesthesia.  A small dermatotomy was made.  Under ultrasound guidance, a

17-gauge guide was advanced into the posterior inferior pole and 8 separate

18-gauge core biopsy specimens were obtained from various regions of the

inferior pole.  Specimens were divided between Lb's solution and

formalin.  Gelfoam pledgets were applied as the needle guide was removed and

hemostasis was achieved with manual compression.



Complications: No immediate



Impression:

1. Ultrasound-guided biopsy of the left kidney as described 



   



 









DICTATED and SIGNED BY:     BLANCA LOPEZ MD

DATE:     12/19/18 1523

[facility]

## 2018-12-19 NOTE — RAD
Procedure: Ultrasound-guided left renal biopsy



Clinical Indication: Adult male with abnormal renal function



Sedation: Conscious sedation was administered with a total intraprocedural

face-to-face time of 22 minutes.  The patient was monitored by a qualified

independent observer throughout the time of sedation.  Please refer to the

medical record for exact doses of medications utilized to achieve moderate

sedation. 



Antibiotics: None



Sterility: The procedure was performed in its entirety using appropriate

elements of sterile technique. 



Consent: 

The procedure was explained in its entirety to the patient or the patients

designated representative by a member of the treatment team, including a

discussion of the risks, benefits and commonly accepted alternatives to the

procedure, as well as the expected consequences of no therapy whatsoever.

Discussion of the risks included, but was not limited to, those that are most

frequent and those that are rare but possibly severe or life-threatening, as

well as the possibility of unforeseen complications.



Technique and Findings: Following informed consent, the patient was prepped

and draped in the usual sterile fashion.  Ultrasound interrogation of the left

kidney revealed no hydronephrosis.  1% lidocaine was used to achieve local

anesthesia.  A small dermatotomy was made.  Under ultrasound guidance, a

17-gauge guide was advanced into the posterior inferior pole and 8 separate

18-gauge core biopsy specimens were obtained from various regions of the

inferior pole.  Specimens were divided between Lb's solution and

formalin.  Gelfoam pledgets were applied as the needle guide was removed and

hemostasis was achieved with manual compression.



Complications: No immediate



Impression:

1. Ultrasound-guided biopsy of the left kidney as described

## 2018-12-24 NOTE — PATHOLOGY
[facility] Accession Number: [id number]

.                                                                01

Material submitted:                                        .

IMAGE GUIDED RENAL BIOPSY

.                                                                01

Clinical history:                                          .

Nephrotic syndrome

.                                                                02

**********************************************************************

Diagnosis:

Special studies report received from [facility], [address] [place] [address], [facility] [address] [address], on case

[id number], labeled with their number [id number], dated 12/21/2018.

.

Specimen submitted:

By Domenico Schultz MD

For Kidney, biopsy

.

DIAGNOSIS:

Consistent with Minimal Change Disease.

.

Global Glomerulosclerosis (7/48).

.

Clinical History:

The patient is a 23 year-old  male who presents for

evaluation of relapse of his minimal change disease.  The patient was

diagnosed with minimal change disease at age 14.  The patient stopped all

of his medications about 18 months ago.  Lab evaluation reveals a

creatinine of 1.1.  Urinalysis reveals proteinuria.

.

Gross Description:

Received from [facility] via [facility] are two specimen

bottles; one bottle contains formalin and the other contains Sam's

fixative.  The bottles are labeled with the patient's name (Gerber Joseph) and outside number ([id number]).

.

Received in formalin are seven pieces of tissue measuring 0.3 x 0.1 x 0.1

cm (tan), 0.5 x 0.1 x 0.1 cm (tan, bloody), 0.8 x 0.1 x 0.1 cm (tan), 1.4

x 0.1 x 0.1 cm (tan), 2.0 x 0.1 x 0.1 cm (tan with a fatty end), and two

at 1.6 x 0.1 x 0.1 cm (tan) .  Two ends are submitted for electron

microscopy and the remainder of the tissue is submitted in its entirety

for light microscopy.

.

Received in Sam's fixative are eight pieces of tan tissue measuring 0.3

x 0.1 x 0.1 cm, 0.4 x 0.1 x 0.1 cm (fatty), 0.6 x 0.1 x 0.1 cm 0.8 x 0.1 x

0.1 cm, 0.9 x 0.1 x 0.1 cm, 1.2 x 0.1 x 0.1 cm (bloody end), and 1.5 x 0.1

x 0.1 cm.  The specimen is submitted in its entirety for

immunofluorescence microscopy.

.

Microscopic Description:

LIGHT MICROSCOPY:

.

Tissue submitted for light microscopic examination is represented by 50%

cortex and 50% medulla.  There are up to 20 glomeruli present, two of

which are globally sclerotic.  The glomeruli ranges from normal to mildly

enlarged in size and normocellular with patent capillary lumina and

glomerular basement membrane of normal thickness.  There is no evidence of

endocapillary proliferation, necrosis, or crescent formation.  No overt

evidence of segmental scarring is identified.  No significant tubular

atrophy or interstitial fibrosis is seen.  The tubules display changes of

patchy tubular injury.  Patchy interstitial inflammation composed of

predominantly lymphoid cells is identified.  Focal extravasation of

Tamm-Horsfall protein is seen.  Focal tubulointerstitial calcification

(calcium phosphate) is identified.  Blood vessels exhibit no significant

intimal fibrosis.  No evidence of arteritis is identified.  Toluidine

blue-stained sections for electron microscopy show four glomeruli, one of

which is globally sclerotic.

.

Standard of care requirements for proper analysis of renal biopsies

mandates serial sections, and PAS, Gary silver, trichrome and SMMT stains

at multiple levels.  PAS stains are used to evaluate various aspects of

the glomerular, tubular, and vascular basement membranes.  Gary silver

stains are used to evaluate thickening, reduplication, "spiking" or

"bubbling" of the glomerular basement membrane.  Toluidine blue stained

sections highlight glomerular basement membranes and demonstrates unusual

types of deposits.  It also reveals details of tubular epithelial cells

and aids in the analysis of vascular lesions.  Mike trichrome stains are

used to evaluate interstitial fibrosis and basement membrane deposits.

The SMMT stain helps evaluate basement membrane changes, immune deposits

and tubulointerstitial scarring.  Controls are routinely run on all

special stains and are verified for acceptability.  A review of the

technical quality of routine slides is made before results are reported.

.

IMMUNOFLUORESCENCE:

The sections are stained for IgG, IgM, IgA, C3, C1q, albumin, fibrinogen,

and kappa and lambda light chains.  Twenty-four glomeruli are present for

evaluation including four globally sclerotic.  All stains are negative in

glomeruli.  There is no significant extraglomerular staining.  Kappa and

lambda stain equally throughout the tubulointerstitium.

.

Positive and negative controls are run on all immunofluorescent stains and

are verified for acceptability before results are reported.  Internal

antigens serve as positive controls.

.

ELECTRON MICROSCOPY:

Two blocks are prepared.  Ultrastructural evaluation of a glomerulus

reveals basement membranes which are uniform and are of normal thickness.

No immune-type electron-dense deposits are present along the glomerular

basement membranes or within the mesangium.  There is widespread

epithelial foot process effacement and microvillus transformation of the

podocytes.  The tubular basement membranes are without deposits.

.

Special procedures including immunofluorescence and electron microscopy

correlate with the light microscopy findings.

.

Note:  Some of the tests reported here may have been developed and

performance characteristics determined by [facility].  They have

not been cleared or approved by the U.S. Food and Drug Administration

(FDA).  The FDA does not require this test to go through premarket FDA

review.  This test is used for clinical purposes.  It should not be

regarded as investigational or for research. [facility] is

certified under the Clinical Laboratory Improvement Amendments of 1988

(CLIA) as qualified to perform high complexity clinical laboratory

testing.

.

Physician/Physician's office called on 12/21/2018 at 2:38 PM [facility].

.

*I have reviewed the clinical history, the pertinent gross findings, all

microscopic materials, discussed the case with the clinician when

appropriate, and have rendered the final diagnosis.

.

CPT Codes Performed:  85194; 88313x7; 18817; 68821; 10736; 02314-EK;

88350x9

lCD Codes:  T86.10

.

Final Diagnosis performed by

Jared Lawrence M.D.

Electronically signed 12/21/2018 4:07:39 PM

.

.

A complete copy of the report is on file.

.

Professional and technical services performed by [facility] at

[address] [facility], [place] [address], [place], [address].

.

([facility] 12/24/2018)

.

This case was prepared and proofread by Dr. Ruddy Herndon and

electronically signed and released by Dr. Johan Nur.

.

(JPM:jose cruz; 12/24/2018)

[facility]/12/24/2018

**********************************************************************

.                                                                02

Electronically signed:                                     .

Mehul Nur MD, Pathologist

NPI- [id number]

.                                                                01

Gross description:                                         .

The specimen is received in formalin, labeled "Gerber Joseph, left renal

biopsy".  Received are five needle cores of pale tan soft tissue ranging

in length from 0.6 to 2.1 cm, with each measuring 0.1 cm in diameter.  The

specimen is forwarded to an outside laboratory for further processing.

.

Also received is a container of Sam's fixative, labeled "Gerber

Joseph, left renal".  Received are seven needle cores of pink-tan soft

tissue ranging in length from 0.4 to 1.4 cm, with each measuring 0.1 cm in

diameter.  The specimen is forwarded for outside laboratory for direct

immunofluorescence studies.

(CAA; 12/19/2018)

QAC/QAC

.                                                                02

Pathologist provided ICD-10:

N04.9

.                                                                02

CPT                                                        .

362127

Specimen Comment: A courtesy copy of this report has been sent to

Specimen Comment: [phone], [phone].

Specimen Comment: Report sent to  / DR SCHULTZ

***Performed at:  01

   [facility]

   [address] [facility] [address], [place]  [id number]

   MD Byron Miranda MD Phone:  [phone]

***Performed at:  02

   [facility]

   [address] [place], [place]  [id number]

   MD Ruddy Herndon MD Phone:  [phone]

## 2018-12-26 ENCOUNTER — HOSPITAL ENCOUNTER (OUTPATIENT)
Dept: HOSPITAL 61 - LAB | Age: 23
Discharge: HOME | End: 2018-12-26
Payer: COMMERCIAL

## 2018-12-26 DIAGNOSIS — N18.1: ICD-10-CM

## 2018-12-26 DIAGNOSIS — N04.0: Primary | ICD-10-CM

## 2018-12-26 LAB
ALBUMIN SERPL-MCNC: 1.5 G/DL (ref 3.4–5)
ANION GAP SERPL CALC-SCNC: 6 MMOL/L (ref 6–14)
APTT PPP: YELLOW S
BACTERIA #/AREA URNS HPF: (no result) /HPF
BILIRUB UR QL STRIP: NEGATIVE
BUN SERPL-MCNC: 10 MG/DL (ref 8–26)
CALCIUM SERPL-MCNC: 8.5 MG/DL (ref 8.5–10.1)
CHLORIDE SERPL-SCNC: 105 MMOL/L (ref 98–107)
CO2 SERPL-SCNC: 33 MMOL/L (ref 21–32)
CREAT SERPL-MCNC: 1 MG/DL (ref 0.7–1.3)
FIBRINOGEN PPP-MCNC: CLEAR MG/DL
GFR SERPLBLD BASED ON 1.73 SQ M-ARVRAT: 112 ML/MIN
GLUCOSE SERPL-MCNC: 92 MG/DL (ref 70–99)
HBV SURFACE AB SER RIA-ACNC: 97 MG/DL
HBV SURFACE AG SERPL QL IA: 55.3 UG/ML
HCT VFR BLD CALC: 46.2 % (ref 39–53)
HGB BLD-MCNC: 15.9 G/DL (ref 13–17.5)
HYALINE CASTS #/AREA URNS LPF: (no result) /HPF
MCHC RBC AUTO-ENTMCNC: 34 G/DL (ref 31–37)
MICRO CREAT RATIO: 57 MG/G CREAT (ref 0–30)
NITRITE UR QL STRIP: NEGATIVE
PH UR STRIP: 7 [PH]
PHOSPHATE SERPL-MCNC: 3.3 MG/DL (ref 2.6–4.7)
POTASSIUM SERPL-SCNC: 3.5 MMOL/L (ref 3.5–5.1)
PROT UR STRIP-MCNC: NEGATIVE MG/DL
PROT UR-MCNC: 14.6 MG/DL
PROT/CREAT UR-RTO: 155 MG/G CREAT (ref 0–200)
RBC #/AREA URNS HPF: (no result) /HPF (ref 0–2)
SODIUM SERPL-SCNC: 144 MMOL/L (ref 136–145)
UROBILINOGEN UR-MCNC: 0.2 MG/DL
WBC #/AREA URNS HPF: (no result) /HPF (ref 0–4)

## 2018-12-26 PROCEDURE — 81001 URINALYSIS AUTO W/SCOPE: CPT

## 2018-12-26 PROCEDURE — 85014 HEMATOCRIT: CPT

## 2018-12-26 PROCEDURE — 85018 HEMOGLOBIN: CPT

## 2018-12-26 PROCEDURE — 82570 ASSAY OF URINE CREATININE: CPT

## 2018-12-26 PROCEDURE — 84156 ASSAY OF PROTEIN URINE: CPT

## 2018-12-26 PROCEDURE — 82043 UR ALBUMIN QUANTITATIVE: CPT

## 2018-12-26 PROCEDURE — 80069 RENAL FUNCTION PANEL: CPT

## 2018-12-26 PROCEDURE — 36415 COLL VENOUS BLD VENIPUNCTURE: CPT

## 2019-07-07 ENCOUNTER — HOSPITAL ENCOUNTER (EMERGENCY)
Dept: HOSPITAL 61 - ER | Age: 24
Discharge: HOME | End: 2019-07-07
Payer: COMMERCIAL

## 2019-07-07 VITALS
SYSTOLIC BLOOD PRESSURE: 126 MMHG | DIASTOLIC BLOOD PRESSURE: 76 MMHG | SYSTOLIC BLOOD PRESSURE: 126 MMHG | SYSTOLIC BLOOD PRESSURE: 126 MMHG | DIASTOLIC BLOOD PRESSURE: 76 MMHG | DIASTOLIC BLOOD PRESSURE: 76 MMHG | SYSTOLIC BLOOD PRESSURE: 126 MMHG | DIASTOLIC BLOOD PRESSURE: 76 MMHG

## 2019-07-07 VITALS — HEIGHT: 75 IN | WEIGHT: 190 LBS | BODY MASS INDEX: 23.62 KG/M2

## 2019-07-07 DIAGNOSIS — Z88.8: ICD-10-CM

## 2019-07-07 DIAGNOSIS — R11.2: Primary | ICD-10-CM

## 2019-07-07 DIAGNOSIS — N04.9: ICD-10-CM

## 2019-07-07 LAB
ALBUMIN SERPL-MCNC: 0.7 G/DL (ref 3.4–5)
ALBUMIN/GLOB SERPL: 0.2 {RATIO} (ref 1–1.7)
ALP SERPL-CCNC: 75 U/L (ref 46–116)
ALT SERPL-CCNC: 34 U/L (ref 16–63)
ANION GAP SERPL CALC-SCNC: (no result) MMOL/L (ref 6–14)
APTT PPP: YELLOW S
AST SERPL-CCNC: 34 U/L (ref 15–37)
BACTERIA #/AREA URNS HPF: (no result) /HPF
BASOPHILS # BLD AUTO: 0.1 X10^3/UL (ref 0–0.2)
BASOPHILS NFR BLD: 1 % (ref 0–3)
BILIRUB SERPL-MCNC: 0.3 MG/DL (ref 0.2–1)
BILIRUB UR QL STRIP: NEGATIVE
BUN SERPL-MCNC: 26 MG/DL (ref 8–26)
BUN/CREAT SERPL: 24 (ref 6–20)
CALCIUM SERPL-MCNC: 7.8 MG/DL (ref 8.5–10.1)
CHLORIDE SERPL-SCNC: 99 MMOL/L (ref 98–107)
CO2 SERPL-SCNC: 35 MMOL/L (ref 21–32)
CREAT SERPL-MCNC: 1.1 MG/DL (ref 0.7–1.3)
EOSINOPHIL NFR BLD: 0.4 X10^3/UL (ref 0–0.7)
EOSINOPHIL NFR BLD: 3 % (ref 0–3)
ERYTHROCYTE [DISTWIDTH] IN BLOOD BY AUTOMATED COUNT: 14.2 % (ref 11.5–14.5)
FIBRINOGEN PPP-MCNC: (no result) MG/DL
GFR SERPLBLD BASED ON 1.73 SQ M-ARVRAT: 99.5 ML/MIN
GLOBULIN SER-MCNC: 4 G/DL (ref 2.2–3.8)
GLUCOSE SERPL-MCNC: 98 MG/DL (ref 70–99)
HCT VFR BLD CALC: 51.1 % (ref 39–53)
HGB BLD-MCNC: 17.5 G/DL (ref 13–17.5)
HYALINE CASTS #/AREA URNS LPF: (no result) /HPF
LYMPHOCYTES # BLD: 3.7 X10^3/UL (ref 1–4.8)
LYMPHOCYTES NFR BLD AUTO: 29 % (ref 24–48)
MCH RBC QN AUTO: 29 PG (ref 25–35)
MCHC RBC AUTO-ENTMCNC: 34 G/DL (ref 31–37)
MCV RBC AUTO: 84 FL (ref 79–100)
MONO #: 1.1 X10^3/UL (ref 0–1.1)
MONOCYTES NFR BLD: 8 % (ref 0–9)
NEUT #: 7.6 X10^3UL (ref 1.8–7.7)
NEUTROPHILS NFR BLD AUTO: 59 % (ref 31–73)
NITRITE UR QL STRIP: NEGATIVE
PH UR STRIP: 6 [PH]
PLATELET # BLD AUTO: 370 X10^3/UL (ref 140–400)
POTASSIUM SERPL-SCNC: 3.7 MMOL/L (ref 3.5–5.1)
PROT SERPL-MCNC: 4.7 G/DL (ref 6.4–8.2)
PROT UR STRIP-MCNC: >=300 MG/DL
RBC # BLD AUTO: 6.11 X10^6/UL (ref 4.3–5.7)
RBC #/AREA URNS HPF: (no result) /HPF (ref 0–2)
SODIUM SERPL-SCNC: 133 MMOL/L (ref 136–145)
SQUAMOUS #/AREA URNS LPF: (no result) /LPF
UROBILINOGEN UR-MCNC: 0.2 MG/DL
WBC # BLD AUTO: 12.9 X10^3/UL (ref 4–11)
WBC #/AREA URNS HPF: (no result) /HPF (ref 0–4)

## 2019-07-07 PROCEDURE — 96361 HYDRATE IV INFUSION ADD-ON: CPT

## 2019-07-07 PROCEDURE — 87086 URINE CULTURE/COLONY COUNT: CPT

## 2019-07-07 PROCEDURE — 96374 THER/PROPH/DIAG INJ IV PUSH: CPT

## 2019-07-07 PROCEDURE — 85025 COMPLETE CBC W/AUTO DIFF WBC: CPT

## 2019-07-07 PROCEDURE — 36415 COLL VENOUS BLD VENIPUNCTURE: CPT

## 2019-07-07 PROCEDURE — 81001 URINALYSIS AUTO W/SCOPE: CPT

## 2019-07-07 PROCEDURE — 80053 COMPREHEN METABOLIC PANEL: CPT

## 2019-07-07 PROCEDURE — 99284 EMERGENCY DEPT VISIT MOD MDM: CPT

## 2019-07-07 NOTE — PHYS DOC
Past Medical History


Past Medical History:  Renal Disease, Other


Additional Past Medical Histor:  nephrotic syndrome


Past Surgical History:  Other


Additional Past Surgical Histo:  2 kidney biopsy


Alcohol Use:  None


Drug Use:  None





Adult General


Chief Complaint


Chief Complaint:  ABDOMINAL PAIN





HPI


HPI


Patient is a 24  year old AA male with history of nephrotic syndrome who 

presents with intermittent mid abdominal pain with nausea and vomiting starting 

earlier today. Patient denies any decreased urinary output, but does report dark

urine. Denies increased urinary frequency urgency burning and hematuria. No 

flank pain, back pain. No leg pain, swelling. No other acute symptoms or 

complaints. Patient is not currently on any medications.[]





Review of Systems


Review of Systems


Review of symptoms as per history of present illness.


All other systems were reviewed and found to be within normal limits, except as 

documented in this note.





Current Medications


Current Medications





Current Medications








 Medications


  (Trade)  Dose


 Ordered  Sig/Gabbi  Start Time


 Stop Time Status Last Admin


Dose Admin


 


 Ondansetron HCl


  (Zofran)  4 mg  1X  ONCE  7/7/19 07:45


 7/7/19 07:46 DC 7/7/19 07:37


4 MG


 


 Sodium Chloride  1,000 ml @ 


 1,000 mls/hr  1X  ONCE  7/7/19 08:30


 7/7/19 09:29  7/7/19 08:23


1,000 MLS/HR











Allergies


Allergies





Allergies








Coded Allergies Type Severity Reaction Last Updated Verified


 


  lisinopril Adverse Reaction Intermediate cough 5/8/17 Yes











Physical Exam


Physical Exam





Constitutional: Well developed, well nourished, no acute distress, non-toxic 

appearance. []


HENT: Normocephalic, atraumatic, bilateral external ears normal, oropharynx mo

ist, no oral exudates, nose normal. []


Eyes: PERRLA, EOMI, conjunctiva normal, no discharge. [] 


Neck: Normal range of motion, no tenderness, supple, no stridor. [] 


Cardiovascular:Heart rate regular rhythm, no murmur []


Lungs & Thorax:  Bilateral breath sounds clear to auscultation []


Abdomen: Bowel sounds normal, soft, no tenderness, no masses, no pulsatile 

masses. [] 


Skin: Warm, dry, no erythema, no rash. [] 


Back: No tenderness. [] 


Extremities: No tenderness, no cyanosis, no clubbing, ROM intact, no edema. [] 


Neurologic: Alert and oriented X 3, normal motor function, normal sensory 

function, no focal deficits noted. []


Psychologic: Affect normal, judgement normal, mood normal. []





Current Patient Data


Vital Signs





                                   Vital Signs








  Date Time  Temp Pulse Resp B/P (MAP) Pulse Ox O2 Delivery O2 Flow Rate FiO2


 


7/7/19 08:16  60 18 131/72 (91) 99 Room Air  


 


7/7/19 07:16 97.8       





 97.8       








Lab Values





                                Laboratory Tests








Test


 7/7/19


07:00 7/7/19


07:14


 


Urine Collection Type Unknown   


 


Urine Color Yellow   


 


Urine Clarity Hazy   


 


Urine pH 6.0   


 


Urine Specific Gravity >=1.030   


 


Urine Protein


 >=300 mg/dL


(NEG-TRACE) 





 


Urine Glucose (UA)


 Negative mg/dL


(NEG) 





 


Urine Ketones (Stick)


 Negative mg/dL


(NEG) 





 


Urine Blood Large (NEG)   


 


Urine Nitrite


 Negative (NEG)


 





 


Urine Bilirubin


 Negative (NEG)


 





 


Urine Urobilinogen Dipstick


 0.2 mg/dL (0.2


mg/dL) 





 


Urine Leukocyte Esterase


 Negative (NEG)


 





 


Urine RBC


 3-5 /HPF (0-2)


 





 


Urine WBC


 5-10 /HPF


(0-4) 





 


Urine Squamous Epithelial


Cells Occ /LPF  


 





 


Urine Bacteria


 Many /HPF


(0-FEW) 





 


Urine Hyaline Casts Few /HPF   


 


Urine Mucus Marked /LPF   


 


White Blood Count


 


 12.9 x10^3/uL


(4.0-11.0)  H


 


Red Blood Count


 


 6.11 x10^6/uL


(4.30-5.70)  H


 


Hemoglobin


 


 17.5 g/dL


(13.0-17.5)


 


Hematocrit


 


 51.1 %


(39.0-53.0)


 


Mean Corpuscular Volume


 


 84 fL ()





 


Mean Corpuscular Hemoglobin  29 pg (25-35)  


 


Mean Corpuscular Hemoglobin


Concent 


 34 g/dL


(31-37)


 


Red Cell Distribution Width


 


 14.2 %


(11.5-14.5)


 


Platelet Count


 


 370 x10^3/uL


(140-400)


 


Neutrophils (%) (Auto)  59 % (31-73)  


 


Lymphocytes (%) (Auto)  29 % (24-48)  


 


Monocytes (%) (Auto)  8 % (0-9)  


 


Eosinophils (%) (Auto)  3 % (0-3)  


 


Basophils (%) (Auto)  1 % (0-3)  


 


Neutrophils # (Auto)


 


 7.6 x10^3uL


(1.8-7.7)


 


Lymphocytes # (Auto)


 


 3.7 x10^3/uL


(1.0-4.8)


 


Monocytes # (Auto)


 


 1.1 x10^3/uL


(0.0-1.1)


 


Eosinophils # (Auto)


 


 0.4 x10^3/uL


(0.0-0.7)


 


Basophils # (Auto)


 


 0.1 x10^3/uL


(0.0-0.2)


 


Sodium Level


 


 133 mmol/L


(136-145)  L


 


Potassium Level


 


 3.7 mmol/L


(3.5-5.1)


 


Chloride Level


 


 99 mmol/L


()


 


Carbon Dioxide Level


 


 35 mmol/L


(21-32)  H


 


Anion Gap   (6-14)  


 


Blood Urea Nitrogen


 


 26 mg/dL


(8-26)


 


Creatinine


 


 1.1 mg/dL


(0.7-1.3)


 


Estimated GFR


(Cockcroft-Gault) 


 99.5  





 


BUN/Creatinine Ratio  24 (6-20)  H


 


Glucose Level


 


 98 mg/dL


(70-99)


 


Calcium Level


 


 7.8 mg/dL


(8.5-10.1)  L


 


Total Bilirubin


 


 0.3 mg/dL


(0.2-1.0)


 


Aspartate Amino Transferase


(AST) 


 34 U/L (15-37)





 


Alanine Aminotransferase (ALT)


 


 34 U/L (16-63)





 


Alkaline Phosphatase


 


 75 U/L


()


 


Total Protein


 


 4.7 g/dL


(6.4-8.2)  L


 


Albumin


 


 0.7 g/dL


(3.4-5.0)  L


 


Albumin/Globulin Ratio


 


 0.2 (1.0-1.7)


L





                                Laboratory Tests


7/7/19 07:14








                                Laboratory Tests


7/7/19 07:14











EKG


EKG


[]





Radiology/Procedures


Radiology/Procedures


[]





Course & Med Decision Making


Course & Med Decision Making


Pertinent Labs and Imaging studies reviewed. (See chart for details)





[Patient's abdomen soft, nontender. Nausea improved with Zofran. Patient's labs 

reviewed suggestive of syndrome. IV fluids given to prevent further dehydration.

 Recommendations are continued supportive care and close follow-up with neuro 

follow G for further treatment. Return precautions reviewed.]





Dragon Disclaimer


Dragon Disclaimer


This electronic medical record was generated, in whole or in part, using a voice

recognition dictation system.





Departure


Departure


Impression:  


   Primary Impression:  


   Vomiting


   Additional Impressions:  


   Abdominal pain


   Nephrotic syndrome


Disposition:  01 HOME, SELF-CARE


Condition:  STABLE


Referrals:  


NO PCP (PCP)


Patient Instructions:  Abdominal Pain, Nausea and Vomiting, Easy-to-Read, 

Nephrotic Syndrome





Additional Instructions:  


Please take nausea medication as directed. Drink clear liquids only for the next

6-12 hours then gradually increase to bland diet as tolerated. Contact your 

nephrologist tomorrow regarding further management of nephrotic syndrome. Return

to the ED if new or worsening symptoms.


Scripts


Ondansetron Hcl (ZOFRAN) 4 Mg Tablet


1 TAB PO Q6HRS, #10 TAB 0 Refills


   Prov: ALFRED COE DO         7/7/19





Problem Qualifiers











ALFRED COE DO                    Jul 7, 2019 07:39

## 2019-07-26 ENCOUNTER — HOSPITAL ENCOUNTER (OUTPATIENT)
Dept: HOSPITAL 61 - LAB | Age: 24
Discharge: HOME | End: 2019-07-26
Payer: COMMERCIAL

## 2019-07-26 DIAGNOSIS — R80.1: ICD-10-CM

## 2019-07-26 DIAGNOSIS — N26.9: ICD-10-CM

## 2019-07-26 DIAGNOSIS — N04.0: Primary | ICD-10-CM

## 2019-07-26 DIAGNOSIS — N18.1: ICD-10-CM

## 2019-07-26 DIAGNOSIS — R31.9: ICD-10-CM

## 2019-07-26 LAB
ALBUMIN SERPL-MCNC: 2.1 G/DL (ref 3.4–5)
ANION GAP SERPL CALC-SCNC: 7 MMOL/L (ref 6–14)
APTT PPP: YELLOW S
BACTERIA #/AREA URNS HPF: 0 /HPF
BILIRUB UR QL STRIP: NEGATIVE
BUN SERPL-MCNC: 12 MG/DL (ref 8–26)
CALCIUM SERPL-MCNC: 8.6 MG/DL (ref 8.5–10.1)
CHLORIDE SERPL-SCNC: 101 MMOL/L (ref 98–107)
CO2 SERPL-SCNC: 29 MMOL/L (ref 21–32)
CREAT SERPL-MCNC: 0.8 MG/DL (ref 0.7–1.3)
CREATININE,RANDOM URINE: 206.9 MG/DL
FIBRINOGEN PPP-MCNC: CLEAR MG/DL
GFR SERPLBLD BASED ON 1.73 SQ M-ARVRAT: 143.7 ML/MIN
GLUCOSE SERPL-MCNC: 98 MG/DL (ref 70–99)
NITRITE UR QL STRIP: NEGATIVE
PH UR STRIP: 7 [PH]
PHOSPHATE SERPL-MCNC: 3.4 MG/DL (ref 2.6–4.7)
POTASSIUM SERPL-SCNC: 3.7 MMOL/L (ref 3.5–5.1)
PROT UR STRIP-MCNC: NEGATIVE MG/DL
RBC #/AREA URNS HPF: 0 /HPF (ref 0–2)
SODIUM SERPL-SCNC: 137 MMOL/L (ref 136–145)
SQUAMOUS #/AREA URNS LPF: (no result) /LPF
UROBILINOGEN UR-MCNC: 1 MG/DL
WBC #/AREA URNS HPF: (no result) /HPF (ref 0–4)

## 2019-07-26 PROCEDURE — 84156 ASSAY OF PROTEIN URINE: CPT

## 2019-07-26 PROCEDURE — 81001 URINALYSIS AUTO W/SCOPE: CPT

## 2019-07-26 PROCEDURE — 80069 RENAL FUNCTION PANEL: CPT

## 2019-07-26 PROCEDURE — 36415 COLL VENOUS BLD VENIPUNCTURE: CPT

## 2019-07-26 PROCEDURE — 82570 ASSAY OF URINE CREATININE: CPT

## 2020-01-10 ENCOUNTER — HOSPITAL ENCOUNTER (OUTPATIENT)
Dept: HOSPITAL 61 - LAB | Age: 25
Discharge: HOME | End: 2020-01-10
Payer: COMMERCIAL

## 2020-01-10 DIAGNOSIS — E78.5: ICD-10-CM

## 2020-01-10 DIAGNOSIS — N04.0: Primary | ICD-10-CM

## 2020-01-10 DIAGNOSIS — R80.1: ICD-10-CM

## 2020-01-10 DIAGNOSIS — R77.0: ICD-10-CM

## 2020-01-10 DIAGNOSIS — K85.90: ICD-10-CM

## 2020-01-10 DIAGNOSIS — D57.3: ICD-10-CM

## 2020-01-10 DIAGNOSIS — K76.0: ICD-10-CM

## 2020-01-10 DIAGNOSIS — R03.0: ICD-10-CM

## 2020-01-10 LAB
ALBUMIN SERPL-MCNC: 2.9 G/DL (ref 3.4–5)
ANION GAP SERPL CALC-SCNC: 3 MMOL/L (ref 6–14)
APTT PPP: YELLOW S
BACTERIA #/AREA URNS HPF: (no result) /HPF
BILIRUB UR QL STRIP: NEGATIVE
BUN SERPL-MCNC: 12 MG/DL (ref 8–26)
CALCIUM SERPL-MCNC: 8.8 MG/DL (ref 8.5–10.1)
CHLORIDE SERPL-SCNC: 101 MMOL/L (ref 98–107)
CO2 SERPL-SCNC: 36 MMOL/L (ref 21–32)
CREAT SERPL-MCNC: 0.9 MG/DL (ref 0.7–1.3)
CREATININE,RANDOM URINE: 289.1 MG/DL
FIBRINOGEN PPP-MCNC: CLEAR MG/DL
GFR SERPLBLD BASED ON 1.73 SQ M-ARVRAT: 125.4 ML/MIN
GLUCOSE SERPL-MCNC: 91 MG/DL (ref 70–99)
NITRITE UR QL STRIP: NEGATIVE
PH UR STRIP: 6 [PH]
PHOSPHATE SERPL-MCNC: 3 MG/DL (ref 2.6–4.7)
POTASSIUM SERPL-SCNC: 3.3 MMOL/L (ref 3.5–5.1)
PROT UR STRIP-MCNC: 30 MG/DL
RBC #/AREA URNS HPF: 0 /HPF (ref 0–2)
SODIUM SERPL-SCNC: 140 MMOL/L (ref 136–145)
SQUAMOUS #/AREA URNS LPF: (no result) /LPF
UROBILINOGEN UR-MCNC: 0.2 MG/DL
WBC #/AREA URNS HPF: (no result) /HPF (ref 0–4)

## 2020-01-10 PROCEDURE — 82570 ASSAY OF URINE CREATININE: CPT

## 2020-01-10 PROCEDURE — 81001 URINALYSIS AUTO W/SCOPE: CPT

## 2020-01-10 PROCEDURE — 80069 RENAL FUNCTION PANEL: CPT

## 2020-01-10 PROCEDURE — 84156 ASSAY OF PROTEIN URINE: CPT

## 2020-01-10 PROCEDURE — 36415 COLL VENOUS BLD VENIPUNCTURE: CPT

## 2020-02-07 ENCOUNTER — HOSPITAL ENCOUNTER (OUTPATIENT)
Dept: HOSPITAL 61 - LAB | Age: 25
Discharge: HOME | End: 2020-02-07
Payer: COMMERCIAL

## 2020-02-07 DIAGNOSIS — N17.0: ICD-10-CM

## 2020-02-07 DIAGNOSIS — E87.6: ICD-10-CM

## 2020-02-07 DIAGNOSIS — R80.1: ICD-10-CM

## 2020-02-07 DIAGNOSIS — R03.0: ICD-10-CM

## 2020-02-07 DIAGNOSIS — E78.5: ICD-10-CM

## 2020-02-07 DIAGNOSIS — N04.0: Primary | ICD-10-CM

## 2020-02-07 DIAGNOSIS — R77.0: ICD-10-CM

## 2020-02-07 DIAGNOSIS — K76.0: ICD-10-CM

## 2020-02-07 DIAGNOSIS — D57.3: ICD-10-CM

## 2020-02-07 LAB
APTT PPP: YELLOW S
BACTERIA #/AREA URNS HPF: 0 /HPF
BILIRUB UR QL STRIP: NEGATIVE
CREATININE,RANDOM URINE: 141.7 MG/DL
FIBRINOGEN PPP-MCNC: CLEAR MG/DL
NITRITE UR QL STRIP: NEGATIVE
PH UR STRIP: 6 [PH]
PROT UR STRIP-MCNC: 30 MG/DL
RBC #/AREA URNS HPF: 0 /HPF (ref 0–2)
SQUAMOUS #/AREA URNS LPF: (no result) /LPF
UROBILINOGEN UR-MCNC: 0.2 MG/DL
WBC #/AREA URNS HPF: 0 /HPF (ref 0–4)

## 2020-02-07 PROCEDURE — 82570 ASSAY OF URINE CREATININE: CPT

## 2020-02-07 PROCEDURE — 81001 URINALYSIS AUTO W/SCOPE: CPT

## 2020-02-07 PROCEDURE — 84156 ASSAY OF PROTEIN URINE: CPT
